# Patient Record
Sex: MALE | Race: WHITE | NOT HISPANIC OR LATINO | URBAN - METROPOLITAN AREA
[De-identification: names, ages, dates, MRNs, and addresses within clinical notes are randomized per-mention and may not be internally consistent; named-entity substitution may affect disease eponyms.]

---

## 2017-01-04 ENCOUNTER — AMBULATORY - HEALTHEAST (OUTPATIENT)
Dept: ADDICTION MEDICINE | Facility: CLINIC | Age: 57
End: 2017-01-04

## 2017-09-20 ENCOUNTER — OFFICE VISIT - HEALTHEAST (OUTPATIENT)
Dept: ADDICTION MEDICINE | Facility: CLINIC | Age: 57
End: 2017-09-20

## 2017-09-20 DIAGNOSIS — F10.20 MODERATE ALCOHOL USE DISORDER (H): ICD-10-CM

## 2017-09-25 ENCOUNTER — OFFICE VISIT - HEALTHEAST (OUTPATIENT)
Dept: ADDICTION MEDICINE | Facility: CLINIC | Age: 57
End: 2017-09-25

## 2017-09-25 DIAGNOSIS — F10.20 MODERATE ALCOHOL USE DISORDER (H): ICD-10-CM

## 2017-09-26 ENCOUNTER — OFFICE VISIT - HEALTHEAST (OUTPATIENT)
Dept: ADDICTION MEDICINE | Facility: CLINIC | Age: 57
End: 2017-09-26

## 2017-09-26 DIAGNOSIS — F10.20 MODERATE ALCOHOL USE DISORDER (H): ICD-10-CM

## 2017-09-27 ENCOUNTER — AMBULATORY - HEALTHEAST (OUTPATIENT)
Dept: ADDICTION MEDICINE | Facility: CLINIC | Age: 57
End: 2017-09-27

## 2017-09-27 ENCOUNTER — OFFICE VISIT - HEALTHEAST (OUTPATIENT)
Dept: ADDICTION MEDICINE | Facility: CLINIC | Age: 57
End: 2017-09-27

## 2017-09-27 DIAGNOSIS — F10.20 MODERATE ALCOHOL USE DISORDER (H): ICD-10-CM

## 2017-09-28 ENCOUNTER — OFFICE VISIT - HEALTHEAST (OUTPATIENT)
Dept: ADDICTION MEDICINE | Facility: CLINIC | Age: 57
End: 2017-09-28

## 2017-09-28 DIAGNOSIS — F10.20 MODERATE ALCOHOL USE DISORDER (H): ICD-10-CM

## 2017-09-29 ENCOUNTER — AMBULATORY - HEALTHEAST (OUTPATIENT)
Dept: ADDICTION MEDICINE | Facility: CLINIC | Age: 57
End: 2017-09-29

## 2017-10-02 ENCOUNTER — COMMUNICATION - HEALTHEAST (OUTPATIENT)
Dept: ADDICTION MEDICINE | Facility: CLINIC | Age: 57
End: 2017-10-02

## 2017-10-06 ENCOUNTER — AMBULATORY - HEALTHEAST (OUTPATIENT)
Dept: ADDICTION MEDICINE | Facility: CLINIC | Age: 57
End: 2017-10-06

## 2017-10-10 ENCOUNTER — OFFICE VISIT - HEALTHEAST (OUTPATIENT)
Dept: ADDICTION MEDICINE | Facility: CLINIC | Age: 57
End: 2017-10-10

## 2017-10-10 DIAGNOSIS — F10.20 MODERATE ALCOHOL USE DISORDER (H): ICD-10-CM

## 2017-10-11 ENCOUNTER — OFFICE VISIT - HEALTHEAST (OUTPATIENT)
Dept: ADDICTION MEDICINE | Facility: CLINIC | Age: 57
End: 2017-10-11

## 2017-10-11 DIAGNOSIS — F10.20 MODERATE ALCOHOL USE DISORDER (H): ICD-10-CM

## 2017-10-12 ENCOUNTER — OFFICE VISIT - HEALTHEAST (OUTPATIENT)
Dept: ADDICTION MEDICINE | Facility: CLINIC | Age: 57
End: 2017-10-12

## 2017-10-12 DIAGNOSIS — F10.20 MODERATE ALCOHOL USE DISORDER (H): ICD-10-CM

## 2017-10-14 ENCOUNTER — AMBULATORY - HEALTHEAST (OUTPATIENT)
Dept: ADDICTION MEDICINE | Facility: CLINIC | Age: 57
End: 2017-10-14

## 2017-10-16 ENCOUNTER — OFFICE VISIT - HEALTHEAST (OUTPATIENT)
Dept: ADDICTION MEDICINE | Facility: CLINIC | Age: 57
End: 2017-10-16

## 2017-10-16 DIAGNOSIS — F10.20 MODERATE ALCOHOL USE DISORDER (H): ICD-10-CM

## 2017-10-17 ENCOUNTER — OFFICE VISIT - HEALTHEAST (OUTPATIENT)
Dept: ADDICTION MEDICINE | Facility: CLINIC | Age: 57
End: 2017-10-17

## 2017-10-17 DIAGNOSIS — F10.20 MODERATE ALCOHOL USE DISORDER (H): ICD-10-CM

## 2017-10-18 ENCOUNTER — OFFICE VISIT - HEALTHEAST (OUTPATIENT)
Dept: ADDICTION MEDICINE | Facility: CLINIC | Age: 57
End: 2017-10-18

## 2017-10-18 DIAGNOSIS — F10.20 MODERATE ALCOHOL USE DISORDER (H): ICD-10-CM

## 2017-10-19 ENCOUNTER — OFFICE VISIT - HEALTHEAST (OUTPATIENT)
Dept: ADDICTION MEDICINE | Facility: CLINIC | Age: 57
End: 2017-10-19

## 2017-10-19 DIAGNOSIS — F10.20 MODERATE ALCOHOL USE DISORDER (H): ICD-10-CM

## 2017-10-20 ENCOUNTER — AMBULATORY - HEALTHEAST (OUTPATIENT)
Dept: ADDICTION MEDICINE | Facility: CLINIC | Age: 57
End: 2017-10-20

## 2017-10-24 ENCOUNTER — OFFICE VISIT - HEALTHEAST (OUTPATIENT)
Dept: ADDICTION MEDICINE | Facility: CLINIC | Age: 57
End: 2017-10-24

## 2017-10-24 DIAGNOSIS — F10.20 MODERATE ALCOHOL USE DISORDER (H): ICD-10-CM

## 2017-10-25 ENCOUNTER — OFFICE VISIT - HEALTHEAST (OUTPATIENT)
Dept: ADDICTION MEDICINE | Facility: CLINIC | Age: 57
End: 2017-10-25

## 2017-10-25 DIAGNOSIS — F10.20 MODERATE ALCOHOL USE DISORDER (H): ICD-10-CM

## 2017-10-26 ENCOUNTER — OFFICE VISIT - HEALTHEAST (OUTPATIENT)
Dept: ADDICTION MEDICINE | Facility: CLINIC | Age: 57
End: 2017-10-26

## 2017-10-26 DIAGNOSIS — F10.20 MODERATE ALCOHOL USE DISORDER (H): ICD-10-CM

## 2017-10-30 ENCOUNTER — OFFICE VISIT - HEALTHEAST (OUTPATIENT)
Dept: ADDICTION MEDICINE | Facility: CLINIC | Age: 57
End: 2017-10-30

## 2017-10-30 DIAGNOSIS — F10.20 MODERATE ALCOHOL USE DISORDER (H): ICD-10-CM

## 2017-10-31 ENCOUNTER — AMBULATORY - HEALTHEAST (OUTPATIENT)
Dept: ADDICTION MEDICINE | Facility: CLINIC | Age: 57
End: 2017-10-31

## 2017-11-01 ENCOUNTER — OFFICE VISIT - HEALTHEAST (OUTPATIENT)
Dept: ADDICTION MEDICINE | Facility: CLINIC | Age: 57
End: 2017-11-01

## 2017-11-01 DIAGNOSIS — F10.20 MODERATE ALCOHOL USE DISORDER (H): ICD-10-CM

## 2017-11-02 ENCOUNTER — OFFICE VISIT - HEALTHEAST (OUTPATIENT)
Dept: ADDICTION MEDICINE | Facility: CLINIC | Age: 57
End: 2017-11-02

## 2017-11-02 DIAGNOSIS — F10.20 MODERATE ALCOHOL USE DISORDER (H): ICD-10-CM

## 2017-11-06 ENCOUNTER — AMBULATORY - HEALTHEAST (OUTPATIENT)
Dept: ADDICTION MEDICINE | Facility: CLINIC | Age: 57
End: 2017-11-06

## 2017-11-06 ENCOUNTER — OFFICE VISIT - HEALTHEAST (OUTPATIENT)
Dept: ADDICTION MEDICINE | Facility: CLINIC | Age: 57
End: 2017-11-06

## 2017-11-06 DIAGNOSIS — F10.20 MODERATE ALCOHOL USE DISORDER (H): ICD-10-CM

## 2017-11-07 ENCOUNTER — OFFICE VISIT - HEALTHEAST (OUTPATIENT)
Dept: ADDICTION MEDICINE | Facility: CLINIC | Age: 57
End: 2017-11-07

## 2017-11-07 DIAGNOSIS — F10.20 MODERATE ALCOHOL USE DISORDER (H): ICD-10-CM

## 2017-11-08 ENCOUNTER — OFFICE VISIT - HEALTHEAST (OUTPATIENT)
Dept: ADDICTION MEDICINE | Facility: CLINIC | Age: 57
End: 2017-11-08

## 2017-11-08 DIAGNOSIS — F10.20 MODERATE ALCOHOL USE DISORDER (H): ICD-10-CM

## 2017-11-09 ENCOUNTER — OFFICE VISIT - HEALTHEAST (OUTPATIENT)
Dept: ADDICTION MEDICINE | Facility: CLINIC | Age: 57
End: 2017-11-09

## 2017-11-09 DIAGNOSIS — F10.20 MODERATE ALCOHOL USE DISORDER (H): ICD-10-CM

## 2017-11-10 ENCOUNTER — AMBULATORY - HEALTHEAST (OUTPATIENT)
Dept: ADDICTION MEDICINE | Facility: CLINIC | Age: 57
End: 2017-11-10

## 2017-11-13 ENCOUNTER — OFFICE VISIT - HEALTHEAST (OUTPATIENT)
Dept: ADDICTION MEDICINE | Facility: CLINIC | Age: 57
End: 2017-11-13

## 2017-11-13 DIAGNOSIS — F10.20 MODERATE ALCOHOL USE DISORDER (H): ICD-10-CM

## 2017-11-16 ENCOUNTER — OFFICE VISIT - HEALTHEAST (OUTPATIENT)
Dept: ADDICTION MEDICINE | Facility: CLINIC | Age: 57
End: 2017-11-16

## 2017-11-16 DIAGNOSIS — F10.20 MODERATE ALCOHOL USE DISORDER (H): ICD-10-CM

## 2017-11-20 ENCOUNTER — OFFICE VISIT - HEALTHEAST (OUTPATIENT)
Dept: ADDICTION MEDICINE | Facility: CLINIC | Age: 57
End: 2017-11-20

## 2017-11-20 DIAGNOSIS — F10.20 MODERATE ALCOHOL USE DISORDER (H): ICD-10-CM

## 2017-11-21 ENCOUNTER — AMBULATORY - HEALTHEAST (OUTPATIENT)
Dept: ADDICTION MEDICINE | Facility: CLINIC | Age: 57
End: 2017-11-21

## 2017-11-24 ENCOUNTER — AMBULATORY - HEALTHEAST (OUTPATIENT)
Dept: ADDICTION MEDICINE | Facility: CLINIC | Age: 57
End: 2017-11-24

## 2017-11-30 ENCOUNTER — AMBULATORY - HEALTHEAST (OUTPATIENT)
Dept: ADDICTION MEDICINE | Facility: CLINIC | Age: 57
End: 2017-11-30

## 2017-12-07 ENCOUNTER — OFFICE VISIT - HEALTHEAST (OUTPATIENT)
Dept: ADDICTION MEDICINE | Facility: CLINIC | Age: 57
End: 2017-12-07

## 2017-12-07 ENCOUNTER — AMBULATORY - HEALTHEAST (OUTPATIENT)
Dept: ADDICTION MEDICINE | Facility: CLINIC | Age: 57
End: 2017-12-07

## 2017-12-07 DIAGNOSIS — F10.20 MODERATE ALCOHOL USE DISORDER (H): ICD-10-CM

## 2017-12-11 ENCOUNTER — OFFICE VISIT - HEALTHEAST (OUTPATIENT)
Dept: ADDICTION MEDICINE | Facility: CLINIC | Age: 57
End: 2017-12-11

## 2017-12-11 DIAGNOSIS — F10.20 MODERATE ALCOHOL USE DISORDER (H): ICD-10-CM

## 2017-12-12 ENCOUNTER — OFFICE VISIT - HEALTHEAST (OUTPATIENT)
Dept: ADDICTION MEDICINE | Facility: CLINIC | Age: 57
End: 2017-12-12

## 2017-12-12 DIAGNOSIS — F10.20 MODERATE ALCOHOL USE DISORDER (H): ICD-10-CM

## 2017-12-13 ENCOUNTER — OFFICE VISIT - HEALTHEAST (OUTPATIENT)
Dept: ADDICTION MEDICINE | Facility: CLINIC | Age: 57
End: 2017-12-13

## 2017-12-13 DIAGNOSIS — F10.20 MODERATE ALCOHOL USE DISORDER (H): ICD-10-CM

## 2017-12-14 ENCOUNTER — AMBULATORY - HEALTHEAST (OUTPATIENT)
Dept: ADDICTION MEDICINE | Facility: CLINIC | Age: 57
End: 2017-12-14

## 2020-06-08 ENCOUNTER — AMBULATORY - HEALTHEAST (OUTPATIENT)
Dept: ADDICTION MEDICINE | Facility: CLINIC | Age: 60
End: 2020-06-08

## 2021-05-26 ENCOUNTER — RECORDS - HEALTHEAST (OUTPATIENT)
Dept: ADMINISTRATIVE | Facility: CLINIC | Age: 61
End: 2021-05-26

## 2021-05-27 ENCOUNTER — RECORDS - HEALTHEAST (OUTPATIENT)
Dept: ADMINISTRATIVE | Facility: CLINIC | Age: 61
End: 2021-05-27

## 2021-05-30 ENCOUNTER — RECORDS - HEALTHEAST (OUTPATIENT)
Dept: ADMINISTRATIVE | Facility: CLINIC | Age: 61
End: 2021-05-30

## 2021-06-08 NOTE — PROGRESS NOTES
Verbal Auth:    Patient called this writer and asked that his DC summary be sent to the DMV. Patient gave verbal auth to send the DC on 6/3/20 at 4:09 PM.   PRABHAKAR Diane 6/8/2020 1:01 PM

## 2021-06-13 NOTE — PROGRESS NOTES
Weekly Progress Note  Rajinder Cotter  1960  [id number]      D) Pt attended 0 groups  this week with 4 absences.  Client is dealing with some significant medical issues. Treatment plans have not been signed due to his absences. Client called to report having to see his oncologist on Monday. Expect to here from him then.     A) Staff facilitated groups and reviewed tx progress Not. Assessed for [facility]. R) No VAP needed at this time. Pt working on the following dimensions:  Dimension I Risk Ratin   Reason Risk Rating Assigned: Patient denies any use since 5/15/17. The patient reports no withdrawal symptoms at this time and appears to be fully functioning.   Dimension II Risk Ratin           The patient is HIV+, has had treatment for HEP C, and has had a history of lymphoma (spleen). The patient reports that he takes his medications as directed and finds them helpful. The patient sees Dr. Baker for all medical concerns. The patient has Medicare for insurance  Dimension III Risk Ratin The patient denies any mental health diagnoses however according to his R25 he has been diagnosed with depression and anxiety. The patient has some impulse control skills however when alcohol is involved it becomes difficult for the patient to control impulsivity. The patient reported no thoughts of harming self or others at the time of this assessment. The patient appears to function adequately in life areas.   Dimension IV Risk Ratin            The patient reports he is here to comply with the expectations of probation. He does not feel there is a problem with use however would like to initiate changes into his daily lifestyle. The patient was cooperative through the intake process.  Dimension V Risk Ratin            The patient reports no use since May 15th, 2017. The patient reports 2 prior treatment experiences the 1st being [facility] in 2017, and 2700 in 2017 which he was immediately referred to  [facility] as he did not meet criteria for that level of care. The patient may have minimal understanding of addiction and recidivism issues as he has not been through a formal treatment and lacks understanding and implementation of coping skills.  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 The patient has a son and the mother of his child as support for his recovery, he also reports having supportive friends. The patient is currently on probation [facility] for a DWI that happened in May, 2017. The patient reports minimal structured activities outside of work and watching TV.  T) Patient educated on NA. Patient has completed 13 of 84 program hours at this time. Projected discharge date isTBD. Current discharge plan is TBD    PRABHAKAR Monroe        Psycho-Educational Curriculum  Date Attended  Psycho-Educational Curriculum  Date Attended    Acceptance   Shame/Guilt     1st Step   Anger/Rage     Affirmations   Mental Health     Automatic Negative Thoughts   Anxiety     Cross Addiction   Co-Occurring Disorders     Stages of Change   Tyra/Bipolar     Relapse   Trauma      Addictive Thoughts   Victim Identity     Coping Skills   Sober Structure     Relapse Prevention   Continuum of Care                       Medical Aspects   Non-12 Step Support     Brain/Neurotransmitters   Priorities     Medication Compliance   Spirituality     APARNA Alcohol/Drug Research  17 Weekend Planner     Physical Health   Educational Videos     Post Acute Withdrawal   1st Step     Pregnancy and Drug Use   2nd Step     Sexual Health   Assertive Communication     Short-Term/Long-Term Effects   My name is Cayden SHAFFERAustin    Body bag  What drugs do in your body 17   Medical jeparconnie 17     Research project 17      Relationships   Cross Addiction     Assertive Communication   God As We Understood Him     Boundaries   HBO Relapse    "  Codependence    HBO What Is Addiction     Defense Mechanisms    Medical Aspects 1     Family Roles   Medical Aspects 2     Goodbye Letter   National Geographic: Stress     Intimacy   PBS Depression Out of the Shadows     Needs/Dealbreakers in Relationships   The Anonymous People    Socialization Skills   [facility]     Feelings   Rivas Puckett \"Highjacked Brain\"    ABC Model of Emotion   Stephane Quintana Humor in Tx    Grief and Loss   The Mindfulness Movie    Healthy vs. Unhealthy Feelings   Cayden BREWER documentary     Meditation/Mindfulness       Overconfidence/Complacency       Resentments       Stress         "

## 2021-06-13 NOTE — PROGRESS NOTES
I met with client for a 50 minute 1x1 counseling session to address treatment planning. Client identified long term drinking on a social basis and getting intoxicated from time . Client reported at a young age more drug use including heroin. Client reports now he is stable and has a family, and structure to his day. He presents to treatment for a DWI and to get his drivers license returned. Clients car was impounded and under forfeiture. Clients treatment plan will include, abstinence, self care, identifying triggers and coping skills to arrest ongoing use.Use hx assignments, and networking in the community for sober support groups. Client lives alone, has a 16 year old son that he has a good relationship with and a long term relationship, that he reports is healthy.client reports hobbies as fishing, sports, movies and going to his son's sporting events.  Treatment goals will incorporate enhancement of daily life,wellness and relationships. .  PRABHAKAR Monroe

## 2021-06-13 NOTE — PROGRESS NOTES
Weekly Progress Note  Rajinder Cotter  1960  [id number]      D) Pt attended 4 groups  this week with 0 absences. Client also met with this writer for a 1x1 counseling session to address treatment planning.Treatment plans  Will be signed next session.   A) Staff facilitated groups and reviewed tx progress. Assessed for VA. R) No VAP needed at this time. Pt working on the following dimensions:  Dimension I Risk Ratin                                  Reason Risk Rating Assigned: Patient denies any use since 5/15/17. The patient reports no withdrawal symptoms at this time and appears to be fully functioning.   Dimension II Risk Ratin           The patient is HIV+, has had treatment for HEP C, and has had a history of lymphoma (spleen). The patient reports that he takes his medications as directed and finds them helpful. The patient sees Dr. Baker for all medical concerns. The patient has Medicare for insurance  Dimension III Risk Ratin The patient denies any mental health diagnoses however according to his R25 he has been diagnosed with depression and anxiety. The patient has some impulse control skills however when alcohol is involved it becomes difficult for the patient to control impulsivity. The patient reported no thoughts of harming self or others at the time of this assessment. The patient appears to function adequately in life areas.   Dimension IV Risk Ratin            The patient reports he is here to comply with the expectations of probation. He does not feel there is a problem with use however would like to initiate changes into his daily lifestyle. The patient was cooperative through the intake process.  Dimension V Risk Ratin            The patient reports no use since May 15th, 2017. The patient reports 2 prior treatment experiences the 1st being [facility] in 2017, and 2700 in 2017 which he was immediately referred to [facility] as he did not meet criteria for that level  of care. The patient may have minimal understanding of addiction and recidivism issues as he has not been through a formal treatment and lacks understanding and implementation of coping skills.  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 The patient has a son and the mother of his child as support for his recovery, he also reports having supportive friends. The patient is currently on [facility] for a DWI that happened in May, 2017. The patient reports minimal structured activities outside of work and watching TV.  T) Patient educated on medical aspects. . Patient has completed 13 of 84 program hours at this time. Projected discharge date isTBD. Current discharge plan is TBD    PRABHAKAR Monroe        Psycho-Educational Curriculum  Date Attended  Psycho-Educational Curriculum  Date Attended    Acceptance   Shame/Guilt     1st Step   Anger/Rage     Affirmations   Mental Health     Automatic Negative Thoughts   Anxiety     Cross Addiction   Co-Occurring Disorders     Stages of Change   Tyra/Bipolar     Relapse   Trauma      Addictive Thoughts   Victim Identity     Coping Skills   Sober Structure     Relapse Prevention   Continuum of Care                       Medical Aspects   Non-12 Step Support     Brain/Neurotransmitters   Priorities     Medication Compliance   Spirituality     APARNA Alcohol/Drug Research  17 Weekend Planner     Physical Health   Educational Videos     Post Acute Withdrawal   1st Step     Pregnancy and Drug Use   2nd Step     Sexual Health   Assertive Communication     Short-Term/Long-Term Effects   My name is Cayden SHAFFERAustin    Body bag  What drugs do in your body 17   Medical jepardy 17     Research project 17      Relationships   Cross Addiction     Assertive Communication   God As We Understood Him     Boundaries   HBO Relapse     Codependence    HBO What Is Addiction    "  Defense Mechanisms    Medical Aspects 1     Family Roles   Medical Aspects 2     Goodbye Letter   National Geographic: Stress     Intimacy   PBS Depression Out of the Shadows     Needs/Dealbreakers in Relationships   The Anonymous People    Socialization Skills   [place]     Feelings   Rivas Puckett \"Highjacked Brain\"    ABC Model of Emotion   Stephane Quintana Humor in Tx    Grief and Loss   The Mindfulness Movie    Healthy vs. Unhealthy Feelings   Cayden BREWER documentary     Meditation/Mindfulness       Overconfidence/Complacency       Resentments       Stress         "

## 2021-06-13 NOTE — PROGRESS NOTES
[facility]   Mental Health and Addiction Care   [facility]    [phone] or [phone]   Master Plan   Client Name:  Rajinder Cotter   MRN: [id number]    Counselor: PRABHAKAR Monroe     Title: Dimension I Risk Ratin                Plan Date:   2017   Diagnosis: alcohol use disorder, moderate        Problem: Patient reports having a beer last weekend  And last reported was 17 hard ciders.  It appears reported use is not consistent with records.  The patient reports no withdrawal symptoms at this time and appears to be fully functioning.     Goal: Begin Date2017 Target Date: 17  Maintain abstinence throughout  Outpatient Treatment in order to avoid experiencing withdrawal symptoms and to meet OP expectations.   Method 1: Begin Date:2017 Target Date:  17 Date Completed:   Attend OP groups as directed and share thoughts, feelings and urges to use, as well as sober supports with staff and peers in order to maintain awareness of details shaping your recovery process.     Title:  Dimension II Risk Ratin  Client has medical issues that are addressed. The patient reports that he takes his medications as directed and finds them helpful. The patient sees Dr. Baker for all medical concerns. The patient has Medicare for insurance   Plan Date:   2017   Diagnosis:alcohol use disorder, moderate        Problem: Client has medical issues that are addressed. The patient reports that he takes his medications as directed and finds them helpful. The patient sees Dr. Baker for all medical concerns. The patient has Medicare for insurance    Goal: Begin Date: 2017 Target Date:  17  Practice living a healthy lifestyle on a daily basis with proper rest, nutrition and exercise.   Method 1: Begin Date:2017 Target Date:  17 Date Completed:   Continue to follow recommendations from your personal care provider regarding medical issues.  Inform staff immediately of any changes in your health that may affect your active participation in group therapy or attendance.    Is Nicotine dependence indicated on the assessment?   Method 2: Begin Date:2017 Target Date:  17 Date Completed:  Staff will provide client with information on tobacco cessation,and tools for quitting.      Title: Dimension III Risk Ratin    Plan Date:   2017   Diagnosis:alcohol use disorder, moderate        Problem:The patient denies any mental health diagnoses however according to his R25 he has been diagnosed with depression and anxiety. The patient has some impulse control skills however when alcohol is involved it becomes difficult for the patient to control impulsivity. The patient reported no thoughts of harming self or others at the time of this assessment. The patient appears to function adequately in life areas.     Goal: Begin Date: 2017 Target Date:  17   To treat mental health effectively while attending treatment in order to increase your ability to meet goals and treatment expectations.   Method 1: Begin Date:2017 Target Date:  17 Date Completed:   Begin to journal on a daily basis recording feelings and event of the day. Reflecting on this daily. Report in group how this is beneficial.  Method 2: Begin Date:2017 Target Date:  17  Date Completed:   Identify 3 personal traits you like about yourself and 3 that you would like to see changes in. Develop a plan to initiate those changes. What will need to happen for these goals to be successful? Share this with counselor.        Title: Dimension IV Risk Ratin            The patient reports he is here to comply with the expectations of probation. He does not feel there is a problem with use however would like to initiate changes into his daily lifestyle. The patient was cooperative through the intake process.   Plan Date:   2017   Diagnosis:alcohol use disorder,  "moderate        Problem:The patient reports he is here to comply with the expectations of probation. He does not feel there is a problem with use however would like to initiate changes into his daily lifestyle. The patient was cooperative through the intake process    Goal: Begin Date: 2017 Target Date:  17  To follow through with intentions to treat chemical dependency concerns while meeting OP treatment expectations in order to graduate successfully from our program.   Method 1: Begin Date:2017 Target Date: 17 Date Completed:   Complete 1st Step (Use History and Consequences) assignment while in Phase I of treatment and present to peers in group. Reflect on feedback received from peers and report findings to staff.   Method 2: Begin Date:2017 Target Date:  17  Date Completed:   Complete all written assignments as assigned by staff including your \"goodbye letter\" and \"relapse prevention plan\" prior to graduation.  Contact staff with questions or concerns about written and oral assignments while attending group therapy.  Method 3: Begin Date:2017 Target Date:  17 Date Completed:   If for any reason you cannot attend group therapy or you will be tardy, contact staff as soon as possible at [phone]. Three unexcused absences  is considered voluntary discharge from the outpatient program.       Title: Dimension V Risk Ratin            .   Plan Date:   2017   Diagnosis:alcohol use disorder, moderate        Problem:The patient reports last use 17The patient reports 2 prior treatment experiences the 1st being [facility] in 2017, and 2700 in 2017 which he was immediately referred to [facility] as he did not meet criteria for that level of care. The patient may have minimal understanding of addiction and recidivism issues as he has not been through a formal treatment and lacks understanding and implementation of coping skills    Goal: Begin Date: " 2017Target Date:  17  To deal effectively with relapse triggers and stressors while building coping skills in order to handle life events without resorting to drug/alcohol use.   Method 1: Begin Date:2017 Target Date:  17 Date Completed:   Participate in OP group check-ins consistently as way of increasing self-awareness and connecting honestly with staff and peers.   Method 2: Begin Date:2017 Target Date:  17 Date Completed:   Develop a list of 7 triggers to your use and identify 10 coping skills you can use to arrest the urge to use. Submit to counselor when finished.    Method 3: Begin Date:2017 Target Date:  17Date Completed:   Report any relapses, if any, on any substances of abuse to staff immediately.        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 The patient has a son and the mother of his child as support for his recovery, he also reports having supportive friends. The patient is currently on probation in [facility] ([facility] for a DWI that happened in May, 2017. The patient reports minimal structured activities outside of work and watching TV. Client has a significant relationship for the last 4 years. Client has a 17 year old son , client is active in his activities and is supported by his family and peers. Client has a .      Goal: Begin Date: 2017 Target Date:  17  To build meaningful structure into your weekly schedule by attending specific recovery activities on a daily basis   Method 1: Begin Date:2017 Target Date:  17 Date Completed:   Find 2 sober support groups you feel comfortable attending on a weekly basis and inform counselor how these meetings are impacting you.Obtain a sponsor before 2nd phase of treatment.    Method 2: Begin Date:2017 Target Date:   12/27/17  Date Completed:   Develop a Bucket list. What activities would you like to take part in. Set goals in intervals.  such as 3 month, 6 month,1 year, 3 years and 5 years, what barriers do you identify at this time for these goals to happen.   Method 3: Begin Date:9/27/2017 Target Date:  12/27/17  Date Completed:   Invite a family member or concerned other who is supportive of your recovery to meet for a family session with your primary counselor, in the effort to help them understand addiction and the causes and to gain knowledge of self care for themselves and for your recovery.    By signing this document, I am acknowledging that I was actively and directly involved in the development of my treatment plan.   I have been a participant in the creation of my individual treatment plan.       Client Signature_________________________________________         Date___9/29/2017           Staff Signature Susan Garcia [facility]  9/29/2017

## 2021-06-13 NOTE — PROGRESS NOTES
Weekly Progress Note  Rajinder Cotter  1960  [id number]  Late entry for wk 10/27/17    D) Pt attended 3 groups this week with 1 absences. Patient attended 0 individual sessions this week.  Client had a court date excused for that A) Staff facilitated groups and reviewed tx progress. Assessed for [facility]. R) No VAP needed at this time. Pt working on the following dimensions:  Dimension #1 - Withdrawal Potential - Risk 0. No concerns  .  Specific goals from treatment plan addressed this week:  remain abstinent  Effectiveness of strategies:  Client reports no use this past week    Dimension #2 - Biomedical - Risk 1. Client has significant medical issues being addressed.   Specific goals from treatment plan addressed this week:  none  Effectiveness of strategies:  NA    Dimension #3 - Emotional/Behavioral/Cognitive - Risk 1. Client lacks impulsivity control  When using, anger .  Specific goals from treatment plan addressed this week:  Identifying positive characteristics and changes that may need to be made in others  Effectiveness of strategies:  Effective, client is verbalizes his coping skills he is using to deal with his frustrations.    Dimension #4 - Treatment Acceptance/Resistance - Risk 1. Client reports his motivation for being here is just his legals. If for not he would not be seeking treatment.   Specific goals from treatment plan addressed this week:  Attend  groups, 4 times weekly  Effectiveness of strategies:  Effective, client attended all groups and is very active in group discussions     Dimension #5 - Relapse Potential - Risk 3. Client understands addiction and relapse and recidivism issues, however continues to use despite other severe addictions. Client has reported since treatment having A BEER, and seeing nothing wrong with that  Specific goals from treatment plan addressed this week:  None   Effectiveness of strategies:  NA    Dimension #6 - Recovery Environment - Risk 2. Lives alone, a year ago  homeless. no sober support meetings . Significant legal issues and time in penitentiary pending.  Specific goals from treatment plan addressed this week:  Addressing legals  Effectiveness of strategies:  Attended court and is following recommendations. No sober group attendance  T) Treatment plan updated no.  Patient notified and in agreement NA.  Patient educated on sober structure. Patient has completed 41 of 84 program hours at this time. Projected discharge date is 12/24/17. Current discharge plan is PRABHAKAR Hernandez  10/31/2017, 1:46 PM        Psycho-Educational Curriculum  Date Attended  Psycho-Educational Curriculum  Date Attended    Acceptance    Shame/Guilt      1st Step    Anger/Rage   10/12/17                                 Affirmations    Mental Health      Automatic Negative Thoughts   10/11/17 Anxiety      Cross Addiction    Co-Occurring Disorders   10/16/17   Stages of Change    Tyra/Bipolar      Relapse    Trauma       Addictive Thoughts    Victim Identity        Prevention Factors 10/17/17     Mental health Bingo 10/18/17               Coping Skills    Sober Structure      Relapse Prevention    Continuum of Care           8 Dimensions of wellness   10/24/17        Goal Setting  10/25/17        Time management  10/26/17   Medical Aspects    Non-12 Step Support      Brain/Neurotransmitters  9/25 Priorities      Medication Compliance    Spirituality      APARNA Alcohol/Drug Research  9/26/17 Weekend Planner      Physical Health    Educational Videos      Post Acute Withdrawal    1st Step      Pregnancy and Drug Use    2nd Step      Sexual Health    Assertive Communication      Short-Term/Long-Term Effects  9/26 My name is Cayden BREWER     Body bag 9/26/417 What drugs do in your body 9/25/17   Medical jepardy 9/28/17       Research project 9/27/17         Relationships    Cross Addiction      Assertive Communication    God As We Understood Him      Boundaries    HBO Relapse      Codependence     HBO  "What Is Addiction      Defense Mechanisms     Medical Aspects 1      Family Roles    Medical Aspects 2      Goodbye Letter    National Geographic: Stress      Intimacy    PBS Depression Out of the Shadows      Needs/Dealbreakers in Relationships    The Anonymous People     Socialization Skills    [place]      Feelings    Rivas Puckett \"Highjacked Brain\"    ABC Model of Emotion    Stephane Quintana Humor in Tx     Grief and Loss    The Mindfulness Movie     Healthy vs. Unhealthy Feelings   10/10/17 Cayden BREWER documentary      Meditation/Mindfulness   Weekly       Overconfidence/Complacency          Resentments          Stress              "

## 2021-06-13 NOTE — PROGRESS NOTES
Intake Note:   D) Rajinder Cotter is a 56 y.o.   White or  male who is referred to [facility] via [address] & Probation with funding from Medicare & [facility]. Patient orientated x 3. Patient meets criteria for Alcohol Use Disorder, Moderate.  Patient appears appropriate for [facility] [place] Intensive Outpatient Day.   A)Completed intake assessment; preliminary paperwork; counselor & supervisor license number and contact info., Patient Bill of Rights, , program rules/regulations, , Abuse Prevention Plan,, confidentiality & HIPPA policies, , grievance procedure,, presented ROIs, , TB & HIV/AIDS policies & resources, and Vulnerable Adult policy, .   Conducted Vulnerable Adult Assessment yes .   R)No special Vulnerable Adult needed at this time. .   Patient signed and agreed to counselor & supervisor license number and contact info., Patient Bill of Rights, , group rules/regulations, , Abuse Prevention Plan,, confidentiality & HIPPA policies, , grievance procedure,, presented ROIs, TB & HIV/AIDS policies & resources, and Vulnerable Adult policy. Patient scored low risk on PANSI screen.   Dimension I Risk Ratin  Reason Risk Rating Assigned: Patient denies any use since 5/15/17. The patient reports no withdrawal symptoms at this time and appears to be fully functioning.   Dimension II Risk Ratin The patient is HIV+, has had treatment for HEP C, and has had a history of lymphoma (spleen). The patient reports that he takes his medications as directed and finds them helpful. The patient sees Dr. Baker for all medical concerns. The patient has Medicare for insurance  Dimension III Risk Ratin The patient denies any mental health diagnoses however according to his R25 he has been diagnosed with depression and anxiety. The patient has some impulse control skills however when alcohol is involved it becomes difficult for the patient to control impulsivity. The patient reported no thoughts of harming self or  others at the time of this assessment. The patient appears to function adequately in life areas.   Dimension IV Risk Ratin  The patient reports he is here to comply with the expectations of probation. He does not feel there is a problem with use however would like to initiate changes into his daily lifestyle. The patient was cooperative through the intake process.  Dimension V Risk Ratin  The patient reports no use since May 15th, 2017. The patient reports 2 prior treatment experiences the 1st being [facility] in 2017, and [facility] in 2017 which he was immediately referred to [facility] as he did not meet criteria for that level of care. The patient may have minimal understanding of addiction and recidivism issues as he has not been through a formal treatment and lacks understanding and implementation of coping skills.  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 The patient has a son and the mother of his child as support for his recovery, he also reports having supportive friends. The patient is currently on probation in [facility] for a DWI that happened in May, 2017. The patient reports minimal structured activities outside of work and watching TV.  T) Explained counselor & supervisor license number and contact info., Patient Bill of Rights, program rules/regulations, Abuse Prevention Plan, confidentiality & HIPPA policies, grievance procedure, presented ROIs, TB & HIV/AIDS policies & resources, and Vulnerable Adult policy.

## 2021-06-13 NOTE — PROGRESS NOTES
Weekly Progress Note  Rajinder Cotter  1960  [id number]      D) Pt attended 4 groups this week with 0 absences. Patient attended 0 individual sessions this week. A) Staff facilitated groups and reviewed tx progress. Assessed for [facility]. R) No VAP needed at this time. Pt working on the following dimensions:  Dimension #1 - Withdrawal Potential - Risk 0. No concerns  .  Specific goals from treatment plan addressed this week:  remain abstinent  Effectiveness of strategies:  Client reports no use this past week    Dimension #2 - Biomedical - Risk 1. Client has significant medical issues being addressed.   Specific goals from treatment plan addressed this week:  none  Effectiveness of strategies:  NA    Dimension #3 - Emotional/Behavioral/Cognitive - Risk 1. Client lacks impulsivity control  When using, anger .  Specific goals from treatment plan addressed this week:  Identifying positive characteristics and changes that may need to be made in others  Effectiveness of strategies:  Effective, client reports using coping skills to deal with anger and frustration    Dimension #4 - Treatment Acceptance/Resistance - Risk 1. Client reports his motivation for being here is just his legals. If for not he would not be seeking treatment.   Specific goals from treatment plan addressed this week:  Attend  groups, 4 times weekly  Effectiveness of strategies:  Effective, client attended all groups    Dimension #5 - Relapse Potential - Risk 3. Client understands addiction and relapse and recidivism issues, however continues to use despite other severe addictions. Client has reported since treatment having A BEER, and seeing nothing wrong with that  Specific goals from treatment plan addressed this week:  None   Effectiveness of strategies:  NA    Dimension #6 - Recovery Environment - Risk 2. Lives alone, a year ago homeless. no sober support meetings . Significant legal issues and time in intermediate pending.  Specific goals from treatment  plan addressed this week:  Addressing legals  Effectiveness of strategies:  Effective, met with probation reports upcoming court in [facility]  T) Treatment plan updated no.  Patient notified and in agreement NA.  Patient educated on mental health and wellness. Patient has completed 32 of 84 program hours at this time. Projected discharge date is 12/24/17. Current discharge plan is PRABHAKAR Hernandez  10/20/2017, 8:51 AM        Psycho-Educational Curriculum  Date Attended  Psycho-Educational Curriculum  Date Attended    Acceptance    Shame/Guilt      1st Step    Anger/Rage   10/12/17                                 Affirmations    Mental Health      Automatic Negative Thoughts   10/11/17 Anxiety      Cross Addiction    Co-Occurring Disorders   10/16/17   Stages of Change    Tyra/Bipolar      Relapse    Trauma       Addictive Thoughts    Victim Identity        Prevention Factors 10/17/17     Mental health Bingo 10/18/17               Coping Skills    Sober Structure      Relapse Prevention    Continuum of Care                                    Medical Aspects    Non-12 Step Support      Brain/Neurotransmitters  9/25 Priorities      Medication Compliance    Spirituality      [employer]AR[employer] Alcohol/Drug Research  9/26/17 Weekend Planner      Physical Health    Educational Videos      Post Acute Withdrawal    1st Step      Pregnancy and Drug Use    2nd Step      Sexual Health    Assertive Communication      Short-Term/Long-Term Effects  9/26 My name is Cayden BREWER     Body bag 9/26/417 What drugs do in your body 9/25/17   Medical jepardy 9/28/17       Research project 9/27/17         Relationships    Cross Addiction      Assertive Communication    God As We Understood Him      Boundaries    HBO Relapse      Codependence     HBO What Is Addiction      Defense Mechanisms     Medical Aspects 1      Family Roles    Medical Aspects 2      Goodbye Letter    National Geographic: Stress      Intimacy    PBS Depression Out  "of the Shadows      Needs/Dealbreakers in Relationships    The Anonymous People     Socialization Skills    [place]      Feelings    Rivas Puckett \"Highjacked Brain\"    ABC Model of Emotion    Stephane Quintana Humor in Tx     Grief and Loss    The Mindfulness Movie     Healthy vs. Unhealthy Feelings   10/10/17 Cayden BREWER documentary      Meditation/Mindfulness   Weekly       Overconfidence/Complacency          Resentments          Stress              "

## 2021-06-13 NOTE — PROGRESS NOTES
Addiction Services - Initial Services Plan      Name:  Rajinder Cotter       :  1960        MRN:  [id number]    Goal Methods   1.  Acceptance of chemical dependency and mental illness as a disease. A.  Comply with all med/psych recommendations  B.  Complete preliminary interviews  C.  Attend all program functions  D.  Attend all individual counseling sessions  E.  Read all assigned literature  F.  Complete psychological testing as assigned  G.  Particpate in any necessary consultations     2.  Acceptance of my need and ability to change A.  Complete written workbook assignments on MICD  B.  Participate in conferences (P.O., , family)  C.  Participate in 12 Step/support groups  D.  Actively participate in treatment planning and reviews  E.  Complete all assignments given or recommended on Treatment Plan  F.  Present Drug History/Peer Assessment with peer group  G.  Complete 10th Step Inventory daily   3.  Acceptance of staff recommendations as a means to my recovery A.  Participate in all interviews for Continuum of Care Plans  B.  Familiarize self with 12 Step Recovery Program and how this applies to your day-to-day behavior  C.  Demonstrate a working knowledge of AA steps of recovery  D.  Obtain a sponsor     Patient describes their immediate need: Needs a treatment verification. Structured activities.   Are there any immediate Safety Needs such as (physical, stability, mobility):  Denies     Immediate Health Needs and Plan:    None at this time   Vulnerable Adult:  Yes    [x] Continue Current Medications for: HIV, Oxycodone for pain as prescribed.   [] Request Consult for: Denies   [] Notify Attending Physician about: Denies   [] Other:  Denies     Issues to be addressed i the first sessions:    Listen and take things in.     Patient strengths and needs:  S- Personality, Responsible  N- Unknown    Plan for patient for time between intake and completion of the treatment plan:  -Attend group   9:30am -12:30   -Follow the Intake assessment      Staff Members' Titles authorized to Initiate Services are:    Director of Behavioral Service    Clinical Director of Chemical Dependency    Primary Counselor    MERLYN Wagoner. Counselor        Nursing Staff    Vulnerable Adult Review  [x] Review of the facility Abuse Prevention plan was reviewed with the patient  [x] No individual abuse plan is necessary  [] In addition to the facility Abuse Prevention plan, an Individual Abuse Plan will be put in place    I understand these goals to be the Treatment Goals of the Program, and I agree to the stated Methods in attempting to accomplish these goals.    Patient Signature:  _________________________Date:  9/20/2017      Staff Name/Title:  PRABHAKAR Iqbal     Date:  9/20/2017  Time: 2:01 PM

## 2021-06-13 NOTE — PROGRESS NOTES
Weekly Progress Note  Rajinder Cotter  1960  [id number]  Late entry for wk 11/3/17    D) Pt attended 3 groups this week with 1 absences. Patient attended 0 individual sessions this week. A) Staff facilitated groups and reviewed tx progress. Assessed for [facility]. R) No VAP needed at this time. Pt working on the following dimensions:  Dimension #1 - Withdrawal Potential - Risk 0. No concerns  .  Specific goals from treatment plan addressed this week:  remain abstinent  Effectiveness of strategies:  Client reports no use this past week    Dimension #2 - Biomedical - Risk 1. Client has significant medical issues being addressed.   Specific goals from treatment plan addressed this week:  none  Effectiveness of strategies:  NA    Dimension #3 - Emotional/Behavioral/Cognitive - Risk 1. Client lacks impulsivity control  When using, anger .  Specific goals from treatment plan addressed this week:  Identifying positive characteristics and changes that may need to be made in others  Effectiveness of strategies:  Effective, client is verbalizes his coping skills he is using to deal with his frustrations.    Dimension #4 - Treatment Acceptance/Resistance - Risk 1. Client reports his motivation for being here is just his legals. If for not he would not be seeking treatment.   Specific goals from treatment plan addressed this week:  Present Use History  Effectiveness of strategies:  Effective, Client presented his use history and received good feedback. Client is an active member of the group    Dimension #5 - Relapse Potential - Risk 3. Client understands addiction and relapse and recidivism issues, however continues to use despite other severe addictions. Client has reported since treatment having A BEER, and seeing nothing wrong with that  Specific goals from treatment plan addressed this week:  Coping skills   Effectiveness of strategies:  Effective Client reports using mindfulness meditation     Dimension #6 - Recovery  Environment - Risk 2. Lives alone, a year ago homeless. no sober support meetings . Significant legal issues and time in care home pending.  Specific goals from treatment plan addressed this week:  no  Effectiveness of strategies:  no  T) Treatment plan updated no.  Patient notified and in agreement NA.  Patient educated on sober structure. Patient has completed 50 of 84 program hours at this time. Projected discharge date is 12/24/17. Current discharge plan is TBD     PRABHAKAR Monroe  11/6/2017, 6:37 AM        Psycho-Educational Curriculum  Date Attended  Psycho-Educational Curriculum  Date Attended    Acceptance   10/30/17 Shame/Guilt      1st Step   11/2/17 Anger/Rage   10/12/17   Cost of addiction 11/1/17     Serenity prayer 10/30                       Affirmations    Mental Health      Automatic Negative Thoughts   10/11/17 Anxiety      Cross Addiction    Co-Occurring Disorders   10/16/17   Stages of Change    Tyra/Bipolar      Relapse    Trauma       Addictive Thoughts    Victim Identity        Prevention Factors 10/17/17     Mental health Bingo 10/18/17               Coping Skills    Sober Structure      Relapse Prevention    Continuum of Care           8 Dimensions of wellness   10/24/17        Goal Setting  10/25/17        Time management  10/26/17   Medical Aspects    Non-12 Step Support      Brain/Neurotransmitters  9/25 Priorities      Medication Compliance    Spirituality      [employer] Alcohol/Drug Research  9/26/17 Weekend Planner      Physical Health    Educational Videos      Post Acute Withdrawal    1st Step      Pregnancy and Drug Use    2nd Step      Sexual Health    Assertive Communication      Short-Term/Long-Term Effects  9/26 My name is Cayden BREWER     Body bag 9/26/417 What drugs do in your body 9/25/17   Medical jepardy 9/28/17       Research project 9/27/17         Relationships    Cross Addiction      Assertive Communication    God As We Understood Him      Boundaries    HBO Relapse     "  Codependence     HBO What Is Addiction      Defense Mechanisms     Medical Aspects 1      Family Roles    Medical Aspects 2      Goodbye Letter    National Geographic: Stress      Intimacy    PBS Depression Out of the Shadows      Needs/Dealbreakers in Relationships    The Anonymous People     Socialization Skills    [place]      Feelings    Rivas Puckett \"Highjacked Brain\"    ABC Model of Emotion    Stephane Quintana Humor in Tx     Grief and Loss    The Mindfulness Movie     Healthy vs. Unhealthy Feelings   10/10/17 Cayden BREWER documentary      Meditation/Mindfulness   Weekly       Overconfidence/Complacency          Resentments          Stress              "

## 2021-06-14 NOTE — PROGRESS NOTES
Rajinder Cotter attended 3 hours of group therapy today. Client completed IOP today not attending Relapse prevention due to longterm time coming up    12/13/2017 1:50 PM Susan Garcia

## 2021-06-14 NOTE — PROGRESS NOTES
Rajinder Cotter attended 3 hours of group therapy today.    11/20/2017 1:46 PM Rebecca Orta   23-Dec-2019 16:14

## 2021-06-14 NOTE — PROGRESS NOTES
Weekly Progress Note  Rajinder Cotter  1960  [id number]      D) Pt attended 1 groups this week with 1 absences.  Client returned from leave. Had court this week. . Will complete IOP in 3 more sessions  A) Staff facilitated groups and reviewed tx progress.  Assessed for [facility]. R) No VAP needed at this time. Pt working on the following dimensions:  Dimension #1 - Withdrawal Potential - Risk 0. No concerns  .  Specific goals from treatment plan addressed this week:  remain abstinent  Effectiveness of strategies:  Client reports no use this past week LDOU as 7/21/17    Dimension #2 - Biomedical - Risk 1. Client has significant medical issues being addressed.   Specific goals from treatment plan addressed this week:  maintain a balance in his health, follow up with PCP as directed. Medication compliant   Effectiveness of strategies:  Client is med compliant as sees PCP as directed or needed. Reports eating well and good sleep hygiene     Dimension #3 - Emotional/Behavioral/Cognitive - Risk 1. Client lacks impulsivity control  When using, anger . Client reports having a decent childhood and letting down his parents. Feeling of guilt over that, but able to verbalize his feeling well and process these emotions.   Specific goals from treatment plan addressed this week:  no  Effectiveness of strategies:  na    Dimension #4 - Treatment Acceptance/Resistance - Risk 1. Client reports his motivation for being here is just his legals. If for not he would not be seeking treatment.   Specific goals from treatment plan addressed this week:  participate in group discussions and activities  Effectiveness of strategies: Client is an active group member with significant insight into his addiction       Dimension #5 - Relapse Potential - Risk 3. Client understands addiction and relapse and recidivism issues, however continues to use despite other severe addictions. Client has reported since treatment having A BEER, and seeing nothing  wrong with that, now realizing that it is not necessary to drink at all and can cause health issues further.   Specific goals from treatment plan addressed this week:   Identify triggers to use. And coping skills   Effectiveness of strategies:  Effective  Client is able to verbalize his coping skills and recidivism issues      Dimension #6 - Recovery Environment - Risk 2. Lives alone, . no sober support meetings . Significant legal issues and time in senior care  Pending in January . Client report he is busy redecorating his apt.   Specific goals from treatment plan addressed this week:  Taking care of legal issues.   Effectiveness of strategies:  Had court this week will be turning himself in after new year in [facility]. Client will not be referred to relapse prevention for this reason.   T) Treatment plan updated No  Patient notified and in agreement No.  Patient educated on Relationships.  Patient has completed 68 of 84 program hours at this time. Projected discharge date is 12/24/17. Current discharge plan is TBD     PRABHAKAR Monroe  12/7/2017, 3:07 PM        Psycho-Educational Curriculum  Date Attended  Psycho-Educational Curriculum  Date Attended    Acceptance   10/30/17 Shame/Guilt      1st Step   11/2/17 Anger/Rage   10/12/17   Cost of addiction 11/1/17     Serenity prayer 10/30                       Affirmations    Mental Health      Automatic Negative Thoughts   10/11/17 Anxiety   12/7/18   Cross Addiction    Co-Occurring Disorders   10/16/17   Stages of Change   weekly Tyra/Bipolar      Relapse    Trauma       Addictive Thoughts    Victim Identity      stages of relapse  11/7/17 Prevention Factors 10/17/17   Locus of control 11/8/17 Mental health Bingo 10/18/17   Fill your bowl? 11/13/17 dinah 12/7/17         Coping Skills    Sober Structure      Relapse Prevention   11/7/17 Continuum of Care           8 Dimensions of wellness   10/24/17        Goal Setting  10/25/17        Time management  " 10/26/17   Medical Aspects    Non-12 Step Support      Brain/Neurotransmitters  9/25 Priorities      Medication Compliance    Spirituality      [employer] Alcohol/Drug Research  9/26/17 Weekend Planner      Physical Health    Educational Videos      Post Acute Withdrawal  11/6/17  1st Step      Pregnancy and Drug Use    2nd Step      Sexual Health    Assertive Communication      Short-Term/Long-Term Effects  9/26 My name is Cayden BREWER     Body bag 9/26/417 What drugs do in your body 9/25/17   Medical jepardy 9/28/17       Research project 9/27/17         Pain management 11/16/17                 Relationships   11/20/17 Cross Addiction      Assertive Communication    God As We Understood Him      Boundaries    HBO Relapse      Codependence     HBO What Is Addiction      Defense Mechanisms     Medical Aspects 1      Family Roles    Medical Aspects 2      Goodbye Letter    [employer] Geographic: Stress      Intimacy    PBS Depression Out of the Shadows      Needs/Dealbreakers in Relationships    The Anonymous People     Socialization Skills    [place]      Feelings    Rivas Puckett \"Highjacked Brain\"    ABC Model of Emotion    Stephane Quintana Humor in Tx     Grief and Loss    The Mindfulness Movie     Healthy vs. Unhealthy Feelings   10/10/17 Cayden BREWER documentary      Meditation/Mindfulness   Weekly       Overconfidence/Complacency          Resentments          Stress              "

## 2021-06-14 NOTE — PROGRESS NOTES
Weekly Progress Note  Rajinder Cotter  1960  [id number]      D) Pt attended 1 groups this week with 0 absences. Client went home to [place] for the holidays will return next week  Client has lost his ambivalence about treatment and is a intelligent and encouraging participant. He has become a respected peer to others in his group.  A) Staff facilitated groups and reviewed tx progress. Assessed for [facility]. R) No VAP needed at this time. Pt working on the following dimensions:  Dimension #1 - Withdrawal Potential - Risk 0. No concerns  .  Specific goals from treatment plan addressed this week:  remain abstinent  Effectiveness of strategies:  Client reports no use this past week LDOU as 7/21/17    Dimension #2 - Biomedical - Risk 1. Client has significant medical issues being addressed.   Specific goals from treatment plan addressed this week:  maintain a balance in his health, follow up with PCP as directed. Medication compliant   Effectiveness of strategies:  Client is med compliant as sees PCP as directed or needed. Reports eating well and good sleep hygiene     Dimension #3 - Emotional/Behavioral/Cognitive - Risk 1. Client lacks impulsivity control  When using, anger . Client reports having a decent childhood and letting down his parents. Feeling of guilt over that, but able to verbalize his feeling well and process these emotions.   Specific goals from treatment plan addressed this week:  Identifying positive characteristics and changes that may need to be made in certain areas of his life  Effectiveness of strategies:  yes    Dimension #4 - Treatment Acceptance/Resistance - Risk 1. Client reports his motivation for being here is just his legals. If for not he would not be seeking treatment.   Specific goals from treatment plan addressed this week:  participate in group discussions and activities  Effectiveness of strategies: Client is an active group member with significant insight into his addiction       Dimension  #5 - Relapse Potential - Risk 3. Client understands addiction and relapse and recidivism issues, however continues to use despite other severe addictions. Client has reported since treatment having A BEER, and seeing nothing wrong with that, now realizing that it is not necessary to drink at all and can cause health issues further.   Specific goals from treatment plan addressed this week:   Identify triggers to use. And coping skills   Effectiveness of strategies:  Effective  Client is able to verbalize his coping skills and recidivism issues      Dimension #6 - Recovery Environment - Risk 2. Lives alone, . no sober support meetings . Significant legal issues and time in nursing home  Pending in January . Client report he is busy redecorating his apt.   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  Patient educated on Relationships.  Patient has completed 68 of 84 program hours at this time. Projected discharge date is 12/24/17. Current discharge plan is TBD     PRABHAKAR Monroe  11/24/2017, 7:55 AM        Psycho-Educational Curriculum  Date Attended  Psycho-Educational Curriculum  Date Attended    Acceptance   10/30/17 Shame/Guilt      1st Step   11/2/17 Anger/Rage   10/12/17   Cost of addiction 11/1/17     Serenity prayer 10/30                       Affirmations    Mental Health      Automatic Negative Thoughts   10/11/17 Anxiety      Cross Addiction    Co-Occurring Disorders   10/16/17   Stages of Change   weekly Tyra/Bipolar      Relapse    Trauma       Addictive Thoughts    Victim Identity      stages of relapse  11/7/17 Prevention Factors 10/17/17   Locus of control 11/8/17 Mental health Bingo 10/18/17   Fill your bowl? 11/13/17           Coping Skills    Sober Structure     "  Relapse Prevention   11/7/17 Continuum of Care           8 Dimensions of wellness   10/24/17        Goal Setting  10/25/17        Time management  10/26/17   Medical Aspects    Non-12 Step Support      Brain/Neurotransmitters  9/25 Priorities      Medication Compliance    Spirituality      [employer]AR[employer] Alcohol/Drug Research  9/26/17 Weekend Planner      Physical Health    Educational Videos      Post Acute Withdrawal  11/6/17  1st Step      Pregnancy and Drug Use    2nd Step      Sexual Health    Assertive Communication      Short-Term/Long-Term Effects  9/26 My name is Cayedn BREWER     Body bag 9/26/417 What drugs do in your body 9/25/17   Medical jepardy 9/28/17       Research project 9/27/17         Pain management 11/16/17                 Relationships   11/20/17 Cross Addiction      Assertive Communication    God As We Understood Him      Boundaries    HBO Relapse      Codependence     HBO What Is Addiction      Defense Mechanisms     Medical Aspects 1      Family Roles    Medical Aspects 2      Goodbye Letter    [employer] Geographic: Stress      Intimacy    PBS Depression Out of the Shadows      Needs/Dealbreakers in Relationships    The Anonymous People     Socialization Skills    [place]      Feelings    Rivas Puckett \"Highjacked Brain\"    ABC Model of Emotion    Stephane Quintana Humor in [place]     Grief and Loss    The Mindfulness Movie     Healthy vs. Unhealthy Feelings   10/10/17 Cayden BREWER documentary      Meditation/Mindfulness   Weekly       Overconfidence/Complacency          Resentments          Stress              "

## 2021-06-14 NOTE — PROGRESS NOTES
[facility]   Mental Health and Addiction Care   [facility], [facility], and [facility]    [phone] or [phone]   Master Plan  Updated Plan 11/10/17   Client Name:  Rajinder Cotter   MRN: [id number]    Counselor: PRABHAKAR Monroe     Title: Dimension I Risk Ratin                Plan Date:   11/10/2017   Diagnosis: alcohol use disorder, moderate        Problem: Patient reports having a beer last weekend  And last reported was 17 hard ciders.  It appears reported use is not consistent with records.  The patient reports no withdrawal symptoms at this time and appears to be fully functioning.     Goal: Begin Date2017 Target Date: 17  Maintain abstinence throughout  Outpatient Treatment in order to avoid experiencing withdrawal symptoms and to meet OP expectations.   Method 1: Begin Date:2017 Target Date:  17 Date Completed:   Attend OP groups as directed and share thoughts, feelings and urges to use, as well as sober supports with staff and peers in order to maintain awareness of details shaping your recovery process.     Title:  Dimension II Risk Ratin  Client has medical issues that are addressed. The patient reports that he takes his medications as directed and finds them helpful. The patient sees Dr. Baker for all medical concerns. The patient has Medicare for insurance   Plan Date:   11/10/2017   Diagnosis:alcohol use disorder, moderate        Problem: Client has medical issues that are addressed. The patient reports that he takes his medications as directed and finds them helpful. The patient sees Dr. Baker for all medical concerns. The patient has Medicare for insurance    Goal: Begin Date: 2017 Target Date:  17  Practice living a healthy lifestyle on a daily basis with proper rest, nutrition and exercise.   Method 1: Begin Date:2017 Target Date:  17 Date Completed:   Continue to follow recommendations from your personal care provider  regarding medical issues. Inform staff immediately of any changes in your health that may affect your active participation in group therapy or attendance. Remain medication compliant     Is Nicotine dependence indicated on the assessment? yes  Method 2: Begin Date:2017 Target Date:  17 Date Completed:17  Staff will provide client with information on tobacco cessation,and tools for quitting.      Title: Dimension III Risk Ratin    Plan Date:   11/10/2017   Diagnosis:alcohol use disorder, moderate        Problem:The patient denies any mental health diagnoses however according to his R25 he has been diagnosed with depression and anxiety. The patient has some impulse control skills however when alcohol is involved it becomes difficult for the patient to control impulsivity. The patient reported no thoughts of harming self or others at the time of this assessment. The patient appears to function adequately in life areas.     Goal: Begin Date: 2017 Target Date:  17   To treat mental health effectively while attending treatment in order to increase your ability to meet goals and treatment expectations.   Method 1: Begin Date:2017 Target Date:  17 Date Completed:   Begin to journal on a daily basis recording feelings and event of the day. Reflecting on this daily. Report in group how this is beneficial.  Method 2: Begin Date:2017 Target Date:  17  Date Completed:   Identify 3 personal traits you like about yourself and 3 that you would like to see changes in. Develop a plan to initiate those changes. What will need to happen for these goals to be successful? Share this with counselor.        Title: Dimension IV Risk Ratin                Plan Date:   11/10/2017   Diagnosis:alcohol use disorder, moderate        Problem: The patient reports he is here to comply with the expectations of probation. However feels that at this time he does not have a problem with alcohol  The  "client since starting treatment has verbalized his new knowledge of addiction and drug use and finds the information very helpful and useful.     Goal: Begin Date: 2017 Target Date:  17  To follow through with intentions to treat chemical dependency concerns while meeting OP treatment expectations in order to graduate successfully from our program.   Method 1: Begin Date:2017 Target Date: 17 Date Completed:17   Complete 1st Step (Use History and Consequences) assignment while in Phase I of treatment and present to peers in group. Reflect on feedback received from peers and report findings to staff.   Method 2: Begin Date:2017 Target Date:  17  Date Completed:   Complete all written assignments as assigned by staff including your \"goodbye letter\" and \"relapse prevention plan\" prior to graduation.  Contact staff with questions or concerns about written and oral assignments while attending group therapy.  Method 3: Begin Date:2017 Target Date:  17 Date Completed:   If for any reason you cannot attend group therapy or you will be tardy, contact staff as soon as possible at [phone]. Three unexcused absences  is considered voluntary discharge from the outpatient program.       Title: Dimension V Risk Ratin            .   Plan Date:   11/10/2017   Diagnosis:alcohol use disorder, moderate        Problem:The patient reports last use 17The patient reports 2 prior treatment experiences the 1st being [facility] in 2017, and 2700 in 2017 which he was immediately referred to [facility] as he did not meet criteria for that level of care. The client has reported 1 beer since starting treatment and expresses the need to not drink.     Goal: Begin Date: 2017Target Date:  17  To deal effectively with relapse triggers and stressors while building coping skills in order to handle life events without resorting to drug/alcohol use.   Method 1: Begin " Date:2017 Target Date:  17 Date Completed:   Participate in OP group check-ins consistently as way of increasing self-awareness and connecting honestly with staff and peers.   Method 2: Begin Date:2017 Target Date:  17 Date Completed:   Develop a list of 7 triggers to your use and identify 10 coping skills you can use to arrest the urge to use. Submit to counselor when finished.    Method 3: Begin Date:2017 Target Date:  17Date Completed:   Report any relapses, if any, on any substances of abuse to staff immediately.        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 The patient has a son and the mother of his child as support for his recovery, he also reports having supportive friends. The patient is [facility] for a DWI that happened in May, 2017.the client has meaningful activities in his life including work,a significant relationship for the last 4 years and he has a 17 year old son , client is active in his activities and is supported by his family and peers. Client has a  in [facility] .      Goal: Begin Date: 2017 Target Date:  17  To build meaningful structure into your weekly schedule by attending specific recovery activities on a daily basis   Method 1: Begin Date:2017 Target Date:  17 Date Completed:  Not an effective intervention canceled method 11/10/17  Find 2 sober support groups you feel comfortable attending on a weekly basis and inform counselor how these meetings are impacting you.Obtain a sponsor before 2nd phase of treatment.    Method 2: Begin Date:2017 Target Date:  17  Date Completed:   Develop a Bucket list. What activities would you like to take part in. Set goals in intervals.  such as 3 month, 6 month,1 year, 3  years and 5 years, what barriers do you identify at this time for these goals to happen.   Method 3: Begin Date:9/27/2017 Target Date:  12/27/17  Date Completed:   Meet with counselor  For 1x1 counseling as directed .   Method 4: Begin Date:11/10/2017 Target Date:  12/27/17  Date Completed:    Follow all recommendations of the courts and remain law abiding throughout treatment programming.   By signing this document, I am acknowledging that I was actively and directly involved in the development of my treatment plan.   I have been a participant in the creation of my individual treatment plan.       Client Signature_________________________________________         Date___11/10/2017           Staff Signature Susan Garcia [facility]  11/10/2017

## 2021-06-14 NOTE — PROGRESS NOTES
Weekly Progress Note  Rajinder Cotter  1960  [id number]      D) Pt attended 4 groups this week with 0 absences. Patient attended 0 individual sessions this week. Client is stepping into phase 2 of IOP next week and will be presenting 2x weekly, Mondays and Thursdays.  Client has lost his ambivalence about treatment and is a intelligent and encouraging participant. He has become a respected peer to others in his group.  A) Staff facilitated groups and reviewed tx progress. Assessed for [facility]. R) No VAP needed at this time. Pt working on the following dimensions:  Dimension #1 - Withdrawal Potential - Risk 0. No concerns  .  Specific goals from treatment plan addressed this week:  remain abstinent  Effectiveness of strategies:  Client reports no use this past week LDOU as 7/21/17    Dimension #2 - Biomedical - Risk 1. Client has significant medical issues being addressed.   Specific goals from treatment plan addressed this week:  maintain a balance in his health, follow up with PCP as directed. Medication compliant   Effectiveness of strategies:  Client is med compliant as sees PCP as directed or needed. Reports eating well and good sleep hygiene     Dimension #3 - Emotional/Behavioral/Cognitive - Risk 1. Client lacks impulsivity control  When using, anger . Client reports having a decent childhood and letting down his parents. Feeling of guilt over that, but able to verbalize his feeling well and process these emotions.   Specific goals from treatment plan addressed this week:  Identifying positive characteristics and changes that may need to be made in certain areas of his life  Effectiveness of strategies:  Effective, client is verbalizes his coping skills he is using to deal with his frustrations.    Dimension #4 - Treatment Acceptance/Resistance - Risk 1. Client reports his motivation for being here is just his legals. If for not he would not be seeking treatment.   Specific goals from treatment plan addressed  this week:  Present to group as directed  Effectiveness of strategies:  Effective, Client presented 4 times this week, moving to 2 days now in phase 2 will present to programming Mondays and Thursdays     Dimension #5 - Relapse Potential - Risk 3. Client understands addiction and relapse and recidivism issues, however continues to use despite other severe addictions. Client has reported since treatment having A BEER, and seeing nothing wrong with that, now realizing that it is not necessary to drink at all and can cause health issues further.   Specific goals from treatment plan addressed this week:   Identify triggers to use. And coping skills   Effectiveness of strategies:  Effective  Client is able to verbalize his coping skills and recidivism issues      Dimension #6 - Recovery Environment - Risk 2. Lives alone, a year ago homeless. no sober support meetings . Significant legal issues and time in half-way  Pending in January . Client report she is busy redecorating his apt.   Specific goals from treatment plan addressed this week:   building structure to daily activities  Effectiveness of strategies:  Yes staying busy, relating with other sober people, working.   T) Treatment plan updated yes.  Patient notified and in agreement Yes.  Patient educated on Relapse  Patient has completed 62 of 84 program hours at this time. Projected discharge date is 12/24/17. Current discharge plan is TBD     PRABHAKAR Monroe  11/10/2017, 12:40 PM        Psycho-Educational Curriculum  Date Attended  Psycho-Educational Curriculum  Date Attended    Acceptance   10/30/17 Shame/Guilt      1st Step   11/2/17 Anger/Rage   10/12/17   Cost of addiction 11/1/17     Serenity prayer 10/30                       Affirmations    Mental Health      Automatic Negative Thoughts   10/11/17 Anxiety      Cross Addiction    Co-Occurring Disorders   10/16/17   Stages of Change   weekly Tyra/Bipolar      Relapse    Trauma       Addictive Thoughts     "Victim Identity      stages of relapse  11/7/17 Prevention Factors 10/17/17   Locus of control 11/8/17 Mental health Bingo 10/18/17               Coping Skills    Sober Structure      Relapse Prevention   11/7/17 Continuum of Care           8 Dimensions of wellness   10/24/17        Goal Setting  10/25/17        Time management  10/26/17   Medical Aspects    Non-12 Step Support      Brain/Neurotransmitters  9/25 Priorities      Medication Compliance    Spirituality      [employer] Alcohol/Drug Research  9/26/17 Weekend Planner      Physical Health    Educational Videos      Post Acute Withdrawal  11/6/17  1st Step      Pregnancy and Drug Use    2nd Step      Sexual Health    Assertive Communication      Short-Term/Long-Term Effects  9/26 My name is Cayden BREWER     Body bag 9/26/417 What drugs do in your body 9/25/17   Medical jepardy 9/28/17       Research project 9/27/17         Relationships    Cross Addiction      Assertive Communication    God As We Understood Him      Boundaries    HBO Relapse      Codependence     HBO What Is Addiction      Defense Mechanisms     Medical Aspects 1      Family Roles    Medical Aspects 2      Goodbye Letter    [employer] Geographic: Stress      Intimacy    PBS Depression Out of the Shadows      Needs/Dealbreakers in Relationships    The Anonymous People     Socialization Skills    [place]      Feelings    Rivas Puckett \"Highjacked Brain\"    ABC Model of Emotion    Stephane Quintana Humor in [place]     Grief and Loss    The Mindfulness Movie     Healthy vs. Unhealthy Feelings   10/10/17 Cayden BREWER documentary      Meditation/Mindfulness   Weekly       Overconfidence/Complacency          Resentments          Stress              "

## 2021-06-14 NOTE — PROGRESS NOTES
Weekly Progress Note  Rajinder Cotter  1960  [id number]      D) Pt attended 0 groups this week with 0 absences. Client went home to [place] for the holidays will return next week  Client has lost his ambivalence about treatment and is a intelligent and encouraging participant. He has become a respected peer to others in his group.  A) Staff facilitated groups and reviewed tx progress. NOT Assessed for [facility]. R) No VAP needed at this time. Pt working on the following dimensions:  Dimension #1 - Withdrawal Potential - Risk 0. No concerns  .  Specific goals from treatment plan addressed this week:  remain abstinent  Effectiveness of strategies:  Client reports no use this past week LDOU as 7/21/17    Dimension #2 - Biomedical - Risk 1. Client has significant medical issues being addressed.   Specific goals from treatment plan addressed this week:  maintain a balance in his health, follow up with PCP as directed. Medication compliant   Effectiveness of strategies:  Client is med compliant as sees PCP as directed or needed. Reports eating well and good sleep hygiene     Dimension #3 - Emotional/Behavioral/Cognitive - Risk 1. Client lacks impulsivity control  When using, anger . Client reports having a decent childhood and letting down his parents. Feeling of guilt over that, but able to verbalize his feeling well and process these emotions.   Specific goals from treatment plan addressed this week:  Identifying positive characteristics and changes that may need to be made in certain areas of his life  Effectiveness of strategies:  yes    Dimension #4 - Treatment Acceptance/Resistance - Risk 1. Client reports his motivation for being here is just his legals. If for not he would not be seeking treatment.   Specific goals from treatment plan addressed this week:  participate in group discussions and activities  Effectiveness of strategies: Client is an active group member with significant insight into his addiction        Dimension #5 - Relapse Potential - Risk 3. Client understands addiction and relapse and recidivism issues, however continues to use despite other severe addictions. Client has reported since treatment having A BEER, and seeing nothing wrong with that, now realizing that it is not necessary to drink at all and can cause health issues further.   Specific goals from treatment plan addressed this week:   Identify triggers to use. And coping skills   Effectiveness of strategies:  Effective  Client is able to verbalize his coping skills and recidivism issues      Dimension #6 - Recovery Environment - Risk 2. Lives alone, . no sober support meetings . Significant legal issues and time in group home  Pending in January . Client report he is busy redecorating his apt.   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  Patient educated on Relationships.  Patient has completed 68 of 84 program hours at this time. Projected discharge date is 12/24/17. Current discharge plan is TBD     PRABHAKAR Monroe  11/30/2017, 3:20 PM        Psycho-Educational Curriculum  Date Attended  Psycho-Educational Curriculum  Date Attended    Acceptance   10/30/17 Shame/Guilt      1st Step   11/2/17 Anger/Rage   10/12/17   Cost of addiction 11/1/17     Serenity prayer 10/30                       Affirmations    Mental Health      Automatic Negative Thoughts   10/11/17 Anxiety      Cross Addiction    Co-Occurring Disorders   10/16/17   Stages of Change   weekly Tyra/Bipolar      Relapse    Trauma       Addictive Thoughts    Victim Identity      stages of relapse  11/7/17 Prevention Factors 10/17/17   Locus of control 11/8/17 Mental health Bingo 10/18/17   Fill your bowl? 11/13/17           Coping Skills    Sober  "Structure      Relapse Prevention   11/7/17 Continuum of Care           8 Dimensions of wellness   10/24/17        Goal Setting  10/25/17        Time management  10/26/17   Medical Aspects    Non-12 Step Support      Brain/Neurotransmitters  9/25 Priorities      Medication Compliance    Spirituality      [employer] Alcohol/Drug Research  9/26/17 Weekend Planner      Physical Health    Educational Videos      Post Acute Withdrawal  11/6/17  1st Step      Pregnancy and Drug Use    2nd Step      Sexual Health    Assertive Communication      Short-Term/Long-Term Effects  9/26 My name is Cayden BREWER     Body bag 9/26/417 What drugs do in your body 9/25/17   Medical jepardy 9/28/17       Research project 9/27/17         Pain management 11/16/17                 Relationships   11/20/17 Cross Addiction      Assertive Communication    God As We Understood Him      Boundaries    HBO Relapse      Codependence     HBO What Is Addiction      Defense Mechanisms     Medical Aspects 1      Family Roles    Medical Aspects 2      Goodbye Letter    [employer]: Stress      Intimacy    PBS Depression Out of the Shadows      Needs/Dealbreakers in Relationships    The Anonymous People     Socialization Skills    [place]      Feelings    Rivas Puckett \"Highjacked Brain\"    ABC Model of Emotion    Stephane Quintana Humor in Tx     Grief and Loss    The Mindfulness Movie     Healthy vs. Unhealthy Feelings   10/10/17 Cayden BREWER documentary      Meditation/Mindfulness   Weekly       Overconfidence/Complacency          Resentments          Stress              "

## 2021-06-14 NOTE — PROGRESS NOTES
Weekly Progress Note  Rajinder Cotter  1960  [id number]      D) Pt attended 4 groups this week with 0 absences. Patient attended 0 individual sessions this week. Client is stepping into phase 2 of IOP next week and will be presenting 2x weekly, Mondays and Thursdays.  Client has lost his ambivalence about treatment and is a intelligent and encouraging participant. He has become a respected peer to others in his group.  A) Staff facilitated groups and reviewed tx progress. Assessed for [facility]. R) No VAP needed at this time. Pt working on the following dimensions:  Dimension #1 - Withdrawal Potential - Risk 0. No concerns  .  Specific goals from treatment plan addressed this week:  remain abstinent  Effectiveness of strategies:  Client reports no use this past week LDOU as 7/21/17    Dimension #2 - Biomedical - Risk 1. Client has significant medical issues being addressed.   Specific goals from treatment plan addressed this week:  maintain a balance in his health, follow up with PCP as directed. Medication compliant   Effectiveness of strategies:  Client is med compliant as sees PCP as directed or needed. Reports eating well and good sleep hygiene     Dimension #3 - Emotional/Behavioral/Cognitive - Risk 1. Client lacks impulsivity control  When using, anger . Client reports having a decent childhood and letting down his parents. Feeling of guilt over that, but able to verbalize his feeling well and process these emotions.   Specific goals from treatment plan addressed this week:  Identifying positive characteristics and changes that may need to be made in certain areas of his life  Effectiveness of strategies:  Effective, client is verbalizes his coping skills he is using to deal with his frustrations.    Dimension #4 - Treatment Acceptance/Resistance - Risk 1. Client reports his motivation for being here is just his legals. If for not he would not be seeking treatment.   Specific goals from treatment plan addressed  this week:  Present to group as directed Now in phase 2 and presenting 2x weekly  Effectiveness of strategies:  Effective, Client presented 4 times this week, moving to 2 days now in phase 2 will present to programming Mondays and Thursdays  Client has gained significant insight to his drug use and how it has impacted his life.     Dimension #5 - Relapse Potential - Risk 3. Client understands addiction and relapse and recidivism issues, however continues to use despite other severe addictions. Client has reported since treatment having A BEER, and seeing nothing wrong with that, now realizing that it is not necessary to drink at all and can cause health issues further.   Specific goals from treatment plan addressed this week:   Identify triggers to use. And coping skills   Effectiveness of strategies:  Effective  Client is able to verbalize his coping skills and recidivism issues      Dimension #6 - Recovery Environment - Risk 2. Lives alone, . no sober support meetings . Significant legal issues and time in snf  Pending in January . Client report he is busy redecorating his apt.   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   T) Treatment plan updated No  Patient notified and in agreement No.  Patient educated on medical aspects.  Patient has completed 68 of 84 program hours at this time. Projected discharge date is 12/24/17. Current discharge plan is TBD     PRABHAKAR Monroe  11/21/2017, 2:58 PM        Psycho-Educational Curriculum  Date Attended  Psycho-Educational Curriculum  Date Attended    Acceptance   10/30/17 Shame/Guilt      1st Step   11/2/17 Anger/Rage   10/12/17   Cost of addiction 11/1/17     Serenity prayer 10/30                       Affirmations    Mental Health      Automatic Negative Thoughts   10/11/17 Anxiety     "  Cross Addiction    Co-Occurring Disorders   10/16/17   Stages of Change   weekly Tyra/Bipolar      Relapse    Trauma       Addictive Thoughts    Victim Identity      stages of relapse  11/7/17 Prevention Factors 10/17/17   Locus of control 11/8/17 Mental health Bingo 10/18/17   Fill your bowl? 11/13/17           Coping Skills    Sober Structure      Relapse Prevention   11/7/17 Continuum of Care           8 Dimensions of wellness   10/24/17        Goal Setting  10/25/17        Time management  10/26/17   Medical Aspects    Non-12 Step Support      Brain/Neurotransmitters  9/25 Priorities      Medication Compliance    Spirituality      [employer] Alcohol/Drug Research  9/26/17 Weekend Planner      Physical Health    Educational Videos      Post Acute Withdrawal  11/6/17  1st Step      Pregnancy and Drug Use    2nd Step      Sexual Health    Assertive Communication      Short-Term/Long-Term Effects  9/26 My name is Cayden BREWER     Body bag 9/26/417 What drugs do in your body 9/25/17   Medical jepardy 9/28/17       Research project 9/27/17         Pain management 11/16/17                 Relationships    Cross Addiction      Assertive Communication    God As We Understood Him      Boundaries    HBO Relapse      Codependence     HBO What Is Addiction      Defense Mechanisms     Medical Aspects 1      Family Roles    Medical Aspects 2      Goodbye Letter    [employer] Geographic: Stress      Intimacy    PBS Depression Out of the Shadows      Needs/Dealbreakers in Relationships    The Anonymous People     Socialization Skills    [place]      Feelings    Rivas Puckett \"Highjacked Brain\"    ABC Model of Emotion    Stephane Quintana Humor in Tx     Grief and Loss    The Mindfulness Movie     Healthy vs. Unhealthy Feelings   10/10/17 Cayden BREWER documentary      Meditation/Mindfulness   Weekly       Overconfidence/Complacency          Resentments          Stress              "

## 2021-06-14 NOTE — PROGRESS NOTES
[facility] CHEMICAL DEPENDENCY  DISCHARGE SUMMARY            NAME:  Rajinder Cotter   Physician:  Patience   MRN:  [id number] :  Susan Garcia   #:  [id number] Funding Source:  [facility]   Admit Date: 17 Discharge Date:17   :  1960 Days Completed:83 hours   Initial Diagnosis:    Patient Active Problem List   Diagnosis     Benzodiazepine withdrawal with complication     Seizure     Substance abuse     HIV disease     Withdrawal from benzodiazepine     Hepatitis C     Alcohol abuse     B-cell lymphoma     Noncompliance with medication regimen     Benzodiazepine dependence     Anxiety disorder     Acquired immunodeficiency syndrome     Traumatic brain injury     Tubular adenoma     Opioid dependence     Major depressive disorder     Hepatic cirrhosis     Chronic pain disorder     Severe alcohol use disorder    Final Diagnosis:  Alcohol use disorder in remission   Discharge Address: [address] [place] [address]       Discharge Type:  With Staff Approval (WSA)    Reasons for and circumstances of service termination:  Client has completed Intensive outpatient treatment and met all expectations of programming.     Dimension/Course of Treatment/Individualized Care:   1.  Withdrawal Potential - Risk level - Dimension #1 - Withdrawal Potential - Risk 0. No concerns  Client reports no use  LDOU as 17     Dimension #2 - Biomedical - Risk 1. Client has significant medical issues being addressed many brought on by his drug/alcohol use   At discharge Client was encouraged to maintain a balance in his health, follow up with PCP as directed.  Remain medication compliant. Client has set goals in this area to eat better, walking for exercise. He does have a RX for pain medication, and taking them as directed.   Client is med compliant as sees PCP as directed or needed. Reports eating well and good sleep hygiene      Dimension #3 - Emotional/Behavioral/Cognitive - Risk 1.  Client lacks impulsivity control  When using, anger . Client reports having a decent childhood and letting down his parents. Feeling of guilt over that, but able to verbalize his feeling well and process these emotions.   At dischargeClient has been able to utilize coping strategies in his daily life. Less observations of anger and verbalizing more gratitude.      Dimension #4 - Treatment Acceptance/Resistance - Risk 1. Client reports his motivation for being here is just his legals. If for not he would not be seeking treatment.   At discharge  Client was an active and encouraging participant in group. First group he was not happy to be participating. His attitude changed and he became an important part of the group, gaining respect from his peers. His attendance was good as well as his participation in all group activities. Client presented his use history to the group, he was thorough and thoughtful. received positive feedback from his peers.             Dimension #5 - Relapse Potential - Risk 3. Client understands addiction and relapse and recidivism issues, however continues to use despite other severe addictions.   At discharge  Client is able to verbalize his triggers as anger, boredom. Client is now realizing that it is not necessary to drink at all, he can overcome his triggers to use. Client verbalizes his gratitude for his sobriety  Aware now how serious his use can cause health issues further.            Dimension #6 - Recovery Environment - Risk 2. Lives alone, . no sober support meetings . Significant legal issues and time in [facility] and  has employment. Client is on SSDI and receiving medicare   At discharge   Client report he has been  busy redecorating his apt . And working. Client recently was sentenced in [facility] for latest charge. He will be entering [facility] in February, then home monitoring to completion of his sentence. Client has a teen age son who he spends time with and a long term  S/O . [place], which means a lot to him. He is making amends in some of his relationships.               Strengths and Needs and Services Provided:   Client is good hearted, intelligent, ramsey  Who is liked by most. He has a passion for service.  Needs include fellowship,  And getting through his legal issues.  Client received education on addiction, mental health, medical aspects of addiction, coping and relapse skills, relationships, family roles, Mindfulness practices, spirituality and recreation in recovery               Program Involvement: Good  Attendance: Good  Ability to relate in group/   Other program activities: Good  Assignment Completion: Fair  Overall Behavior: Excellent  Reported Family/Significant   Other Involvement: Good    Prognosis: Good      Recommendations       Attend 12 Step Meetings, Obtain/Retain 12 Step Program Sponsor and [facility] attend [facility]     Mental Health Referral  Med Compliance      Physical Health Referral:  Personal Physician  Follow up as directed and remain medication compliant.                Counselor Name and Title:  PRABHAKAR Monroe          Date:  12/14/2017  Time:  9:53 AM

## 2021-06-16 PROBLEM — F10.20 SEVERE ALCOHOL USE DISORDER (H): Status: ACTIVE | Noted: 2017-09-12

## 2021-07-04 NOTE — PROGRESS NOTES
Rule 31 by Rebecca Hwang LADC at 2017  2:00 PM     Author: Rebecca Hwang LADC Service: -- Author Type: Licensed Alcohol and Drug Counselor    Filed: 2017 11:36 AM Encounter Date: 2017 Status: Signed    : Rebecca Hwang LADC (Licensed Alcohol and Drug Counselor)         [facility] Assessment   Date: 2017        : PRABHAKAR Iqbal    Name: Rajinder Cotter  Address: [address] [place] [address]  Phone: [phone] (home)   Referral Source: [address] & Probation   : 1960  Age: 56 y.o.  Race/Ethnicity: White or   Marital Status:   Employment: Yes- Construction                                                                                                                       Level of Education: Some trade school   Socio-economic (yearly Income) Status: $36,000.00  Sexual Orientation: Heterosexual   Last 4 digits of Social Security: 6971    Is assistance required in the ability to read and understand written material? No     Reason for seeking services:    Court ordered to seek services     Dimension I Acute intoxication/Withdrawal Potential:    Symptomology (past 12 months, check all that apply)  Denies     Observed or reported (withdrawal symptoms, check all that apply)  Denies     Chemical use most recent 12 months outside a facility and other significant use history (client self-report)  Primary Drug Used  Age of First Use  Most Recent Pattern of Use and Duration    Date of last use  Time if substance use in the last 30 days Withdrawal Potential? Requiring special care  Method of use   (oral, smoked, snort, IV, etc)    Alcohol  24 1x per month: couple beer per use 5/15/2017 Evening  No  Oral    Marijuana/Hashish          Cocaine/Crack          Meth/Amphetamines          Heroin          Other Opiates/Synthetics  54 3x per day 15mg per use Oxy  2017 6am  Yes  Oral    Inhalants          Benzodiazepines           Hallucinogens          Barbiturates/Sedatives/Hypnotics          Over-the-Counter Drugs          Other          Nicotine  19 5 per day  2017 1:30pm  No  Smoke        Dimension I Risk Ratin  Reason Risk Rating Assigned: Patient denies any use since 5/15/17. The patient reports no withdrawal symptoms at this time and appears to be fully functioning.         Dimension II Biomedical Conditions:    Any known health conditions: No    Ever previously treated/diagnosed with any eating disorder?  no     List Health Concerns/Conditions Reported: The patient is HIV+, has had treatment for HEP C, and has had a history of lymphoma (spleen).     Are Health Concerns/Conditions being treated? Yes  By Whom? Rivas Baker MD     Are you pregnant: NA OB care received:NA CPS call needed: NA        Dimension II Risk Ratin  Reason Risk Rating Assigned: The patient is HIV+, has had treatment for HEP C, and has had a history of lymphoma (spleen). The patient reports that he takes his medications as directed and finds them helpful. The patient sees Dr. Baker for all medical concerns. The patient has Medicare for insurance.         Dimension III Emotional/Behavioral/Cognitive:    Oriented to person, place, time, situation?  Yes     Current Mental Health Services: no    Past Hospitalization for MH or psychiatric problems: No    How many Hospitalizations: 0   Last Hospitalization; date and location: NA      Past or Current Issues with Gambling (Explain): no    Prior Treatment for Gambling: No     MH Diagnoses:    Denies   Psychiatrist: Denies     Clinic: None    Current Psychotropic Medications:  None    Taking medications as prescribed:  Yes   Medications Helpful: Yes    Current Suicidal Ideation: No  If yes, any plan? No What is plan?: NA    Previous Suicide Attempts?  No   Explain: NA    Current Homicidal Ideation: No  If yes, any plan? NA  What is plan?: NA    Previous Homicide Attempts? No Explain: NA    Suicidal/Homicidal  Ideation in last 30 days? No  Explain: NA    Hazardous behavior engaged in which placed self or others in danger (i.e., operating a motor vehicle, unsafe sex, sharing needles, etc.)?   Driving    Family history of substance and/or mental health diagnosis/issues?  No  Explain: NA    History of abuse (Physical, Emotional, Sexual)? No  Explain: NA       Dimension III Risk Ratin  Reason Risk Rating Assigned: The patient denies any mental health diagnoses however according to his R25 he has been diagnosed with depression and anxiety. The patient has some impulse control skills however when alcohol is involved it becomes difficult for the patient to control impulsivity. The patient reported no thoughts of harming self or others at the time of this assessment. The patient appears to function adequately in life areas.         Dimension IV Readiness to Change:    Mandated, or coerced into assessment or treatment:  Yes    Does client feel there is a problem:   No    Verbalization of need/desire to change: Yes     Impression of : (Check all that apply):    cooperative and ambivalent about change    Are there any spiritual, cultural, or other special needs to be addressed for client to be successful in treatment? no        Dimension IV Risk Ratin  Reason Risk Rating Assigned: The patient reports he is here to comply with the expectations of probation. He does not feel there is a problem with use however would like to initiate changes into his daily lifestyle. The patient was cooperative through the intake process.         Dimension V Relapse/Continued Use/Continued Problem Potential     Client age at First Treatment: 2017    Lifetime # of CD Treatments:  2  List program, dates, and status of completion (within last five years): [facility]- Did not complete, [facility]- Does not meet criteria, Patient did not report other treatment however according to the records he was on 2700 in 2016    Longest Period of  Abstinence: 6-7 years  How did you accomplish this? Support from friends      Circumstances which led to Relapse: Choice to use     Risk Taking/Problem Behaviors Related to Use and/or Under the Influence: Driving       Dimension V Risk Ratin  Reason Risk Rating Assigned: The patient reports no use since May 15th, 2017. The patient reports 2 prior treatment experiences the 1st being [facility] in 2017, and [facility] in 2017 which he was immediatley referred to [facility] as he did not meet criteria for that level of care. The patient may have minimal understanding of addiction and recidivism issues as he has not been through a formal treatment and lacks understanding and implementation of coping skills.         Dimension VI Recovery Environment   Family support:  Yes- Patient has a son Peer Sober Support:  Yes    Current living circumstances:  Alone- feels safe     Environment supportive of recovery:  Yes    Specific activities participating in which do not involve substance use:  Work, friends, football     Specific activities participating in which do involve substance use:  None     People, things that threaten recovery: no    Expected family involvement during treatment services:  None     Current Legal Involvement:  Probation- DWI     Legal Consequences related to use: Probation     Occupational/Academic consequences related to use: None     Current ability to function in a work and/or education setting: Yes     Current support network for recovery (including community-based recovery support): Friends & family.     Do you belong to a Native: No Which Native? 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 The patient has a son and the mother of his child as support for his recovery, he also reports having supportive friends. The patient is currently on probation in [facility]  [place]) for a DWI that happened in May, 2017. The patient reports minimal structured activities outside of work and watching TV.           DSM-V Criteria for Substance Abuse  Instructions:  Determine whether the client currently meets the criteria for a Substance Use Disorder using the diagnostic criteria in the  DSM-V, pp. 481-589. Current means during the most recent 12 months outside a facility that controls access to substances.    Category of substance Severity ICD-10 Code/DSM V Code  Alcohol Use Disorder Mild  Moderate  Severe (F10.10) (305.00)  (F10.20) (303.90)  (F10.20) (303.90)   Cannabis Use Disorder Mild  Moderate  Severe (F12.10) (305.20)  (F12.20) (304.30)  (F12.20) (304.30)   Hallucinogen Use Disorder Mild  Moderate  Severe (F16.10) (305.30)  (F16.20) (304.50)  (F16.20) (304.50)   Inhalant Use Disorder Mild  Moderate  Severe (F18.10) (305.90)  (F18.20) (304.60)  (F18.20) (304.60)   Opioid Use Disorder Mild  Moderate  Severe (F11.10) (305.50)  (F11.20) (304.00)  (F11.20) (304.00)   Sedative, Hypnotic, or Anxiolytic Use Disorder Mild  Moderate  Severe (F13.10) (305.40)  (F13.20) (304.10)  (F13.20) (304.10)   Stimulant Related Disorders Mild              Moderate              Severe   (F15.10) (305.70) Amphetamine type substance  (F14.10) (305.60) Cocaine  (F15.10) (305.70) Other or unspecified stimulant    (F15.20) (304.40) Amphetamine type substance  (F14.20) (304.20) Cocaine  (F15.20) (304.40) Other or unspecified stimulant    (F15.20) (304.40) Amphetamine type substance  (F14.20) (304.20) Cocaine  (F15.20) (304.40) Other or unspecified stimulant   DisorderTobacco use Disorder Mild  Moderate  Severe (Z72.0) (305.1)  (F17.200) (305.1)  (F17.200) (305.1)   Other (or unknown) Substance Use Disorder Mild  Moderate  Severe (F19.10) (305.90)  (F19.20) (304.90)  (F19.20) (304.90)     Diagnostic Impression: Alcohol Use Disorder, Moderate    Assessment Completed Within 3 Sessions of Admission: Yes  If NO,  date assessment to be completed noted in Treatment Plan: No      Signature of Counselor: PRABHAKAR Iqbal  Date and Time of Signature: 9/21/2017, 8:56am